# Patient Record
Sex: FEMALE | Race: WHITE | NOT HISPANIC OR LATINO | Employment: PART TIME | URBAN - METROPOLITAN AREA
[De-identification: names, ages, dates, MRNs, and addresses within clinical notes are randomized per-mention and may not be internally consistent; named-entity substitution may affect disease eponyms.]

---

## 2018-04-10 ENCOUNTER — NON-PROVIDER VISIT (OUTPATIENT)
Dept: OCCUPATIONAL MEDICINE | Facility: CLINIC | Age: 23
End: 2018-04-10

## 2018-04-10 DIAGNOSIS — Z02.1 PRE-EMPLOYMENT DRUG SCREENING: ICD-10-CM

## 2018-04-10 LAB
AMP AMPHETAMINE: NORMAL
COC COCAINE: NORMAL
INT CON NEG: NORMAL
INT CON POS: NORMAL
MET METHAMPHETAMINES: NORMAL
OPI OPIATES: NORMAL
PCP PHENCYCLIDINE: NORMAL
POC DRUG COMMENT 753798-OCCUPATIONAL HEALTH: NORMAL
THC: NORMAL

## 2018-04-10 PROCEDURE — 80305 DRUG TEST PRSMV DIR OPT OBS: CPT | Performed by: PREVENTIVE MEDICINE

## 2018-11-27 ENCOUNTER — OFFICE VISIT (OUTPATIENT)
Dept: URGENT CARE | Facility: CLINIC | Age: 23
End: 2018-11-27
Payer: MEDICAID

## 2018-11-27 ENCOUNTER — APPOINTMENT (OUTPATIENT)
Dept: RADIOLOGY | Facility: IMAGING CENTER | Age: 23
End: 2018-11-27
Attending: NURSE PRACTITIONER
Payer: MEDICAID

## 2018-11-27 VITALS
BODY MASS INDEX: 26.87 KG/M2 | SYSTOLIC BLOOD PRESSURE: 110 MMHG | OXYGEN SATURATION: 97 % | WEIGHT: 146 LBS | HEART RATE: 78 BPM | TEMPERATURE: 99 F | RESPIRATION RATE: 16 BRPM | DIASTOLIC BLOOD PRESSURE: 78 MMHG | HEIGHT: 62 IN

## 2018-11-27 DIAGNOSIS — M25.532 LEFT WRIST PAIN: ICD-10-CM

## 2018-11-27 DIAGNOSIS — M77.9 TENDONITIS: ICD-10-CM

## 2018-11-27 PROCEDURE — 99203 OFFICE O/P NEW LOW 30 MIN: CPT | Performed by: NURSE PRACTITIONER

## 2018-11-27 PROCEDURE — 73110 X-RAY EXAM OF WRIST: CPT | Mod: LT | Performed by: EMERGENCY MEDICINE

## 2018-11-27 RX ORDER — DICLOFENAC SODIUM 75 MG/1
75 TABLET, DELAYED RELEASE ORAL 2 TIMES DAILY
Qty: 30 TAB | Refills: 0 | Status: SHIPPED | OUTPATIENT
Start: 2018-11-27

## 2018-11-27 RX ORDER — IBUPROFEN 200 MG
200 TABLET ORAL EVERY 6 HOURS PRN
COMMUNITY

## 2018-11-27 ASSESSMENT — ENCOUNTER SYMPTOMS
PALPITATIONS: 0
HEADACHES: 0
DIZZINESS: 0
FEVER: 0
SHORTNESS OF BREATH: 0
FALLS: 0
CHILLS: 0
WHEEZING: 0

## 2018-11-27 NOTE — PROGRESS NOTES
"Subjective:   Jose Daniel Raymundo is a 23 y.o. female who presents for Wrist Pain ((L) especially around thumb area is painful, atraumatic, x 2 months )        HPI   Patient presents with new onset left wrist pain that started 2 months ago. She denies any injury to the area, but states that she lifts weights at the gym daily. She has tried wearing a wrist brace and taking OTC Ibuprofen for pain relief. She is worried because the pain has not gone away. She states the pain is worse when trying to pull or bend her wrist towards her thumb. Denies alleviating factors. Rates pain 5/10 and dull.  She states that lifting weights does not aggravate the pain.    Review of Systems   Constitutional: Negative for chills and fever.   Respiratory: Negative for shortness of breath and wheezing.    Cardiovascular: Negative for chest pain and palpitations.   Musculoskeletal: Positive for joint pain. Negative for falls.        Left wrist pain   Skin: Negative for itching and rash.   Neurological: Negative for dizziness and headaches.   All other systems reviewed and are negative.    No Known Allergies   PMH:  has no past medical history on file.  MEDS:   Current Outpatient Prescriptions:   •  ibuprofen (MOTRIN) 200 MG Tab, Take 200 mg by mouth every 6 hours as needed., Disp: , Rfl:   •  diclofenac EC (VOLTAREN) 75 MG Tablet Delayed Response, Take 1 Tab by mouth 2 times a day., Disp: 30 Tab, Rfl: 0  •  ketoconazole (NIZORAL) 2 % CREA, Apply 1 Application to affected area(s) 2 times a day., Disp: 15 g, Rfl: 0  ALLERGIES: No Known Allergies  SURGHX: History reviewed. No pertinent surgical history.  SOCHX:  reports that she has never smoked. She has never used smokeless tobacco. She reports that she does not drink alcohol or use drugs.  FH: Family history was reviewed, no pertinent findings to report     Objective:   /78   Pulse 78   Temp 37.2 °C (99 °F)   Resp 16   Ht 1.575 m (5' 2\")   Wt 66.2 kg (146 lb)   SpO2 97%   BMI 26.70 " kg/m²   Physical Exam   Constitutional: She is oriented to person, place, and time. Vital signs are normal. She appears well-developed and well-nourished. No distress.   HENT:   Head: Normocephalic.   Right Ear: Hearing normal.   Left Ear: Hearing normal.   Nose: Nose normal.   Cardiovascular: Normal rate, regular rhythm and normal heart sounds.    Pulmonary/Chest: Effort normal and breath sounds normal. No respiratory distress. She has no wheezes.   Musculoskeletal:        Left wrist: She exhibits tenderness. She exhibits normal range of motion, no swelling and no effusion.   Left lateral wrist pain with point tenderness. ROM and muscular strength normal with no swelling or redness to the area.     Neurological: She is alert and oriented to person, place, and time. She has normal strength and normal reflexes.   No numbness or tingling   Skin: Skin is warm. Capillary refill takes less than 2 seconds. No rash noted. She is not diaphoretic.        Psychiatric: She has a normal mood and affect. Her behavior is normal. Judgment and thought content normal.   Vitals reviewed.      No pain when radiating thumb. Able to supinate and pronate wrist with no pain.  Assessment/Plan:   Assessment    1. Left wrist pain  - DX-WRIST-COMPLETE 3+ LEFT; Future  - diclofenac EC (VOLTAREN) 75 MG Tablet Delayed Response; Take 1 Tab by mouth 2 times a day.  Dispense: 30 Tab; Refill: 0  - REFERRAL TO SPORTS MEDICINE    2. Tendonitis    Other orders  - ibuprofen (MOTRIN) 200 MG Tab; Take 200 mg by mouth every 6 hours as needed.    Xray negative for fracture    Discussed to stop weight lifting until further evaluated, as may be making the wrist pain worse. Discussed possibility of tendonitis. Patient instructed to not take any other NSAIDs, Aspirin, or Aleve with new prescription.    Patient instructed to continue wearing wrist brace and to stop lifting weights to see if pain improves. Patient to follow up with Sports Medicine.    Rest and  elevate the affected area with pillows.  You may apply ice packs to the affected area up to 4 times daily for 30 minutes at a time  You may apply compression to the affected area by wrapping with an ace bandage    Differential diagnosis, natural history, supportive care, and indications for immediate follow-up discussed.

## 2018-11-27 NOTE — Clinical Note
I noticed this one was seen 3 years ago, but then the last office visit was in 7/2014. This is greatar than 3 years ago. Do I code as 08794 or [id number]?  Thank you!

## 2019-01-03 ENCOUNTER — TELEPHONE (OUTPATIENT)
Dept: SCHEDULING | Facility: IMAGING CENTER | Age: 24
End: 2019-01-03

## 2019-01-25 ENCOUNTER — APPOINTMENT (OUTPATIENT)
Dept: INTERNAL MEDICINE | Facility: MEDICAL CENTER | Age: 24
End: 2019-01-25
Payer: MEDICAID